# Patient Record
Sex: MALE | Race: WHITE | NOT HISPANIC OR LATINO | Employment: OTHER | URBAN - METROPOLITAN AREA
[De-identification: names, ages, dates, MRNs, and addresses within clinical notes are randomized per-mention and may not be internally consistent; named-entity substitution may affect disease eponyms.]

---

## 2023-09-04 PROBLEM — E83.51 LOW CALCIUM LEVELS: Status: ACTIVE | Noted: 2023-09-04

## 2023-09-04 PROBLEM — R91.8 LUNG NODULES: Status: ACTIVE | Noted: 2023-09-04

## 2023-09-04 PROBLEM — K55.069 SUPERIOR MESENTERIC VEIN THROMBOSIS (MULTI): Status: ACTIVE | Noted: 2023-09-04

## 2023-09-04 PROBLEM — T14.8XXA PENETRATING INJURY: Status: ACTIVE | Noted: 2023-09-04

## 2023-09-04 PROBLEM — K21.00 BILE REFLUX ESOPHAGITIS: Status: ACTIVE | Noted: 2023-09-04

## 2023-09-04 PROBLEM — R13.19 OTHER DYSPHAGIA: Status: ACTIVE | Noted: 2023-09-04

## 2023-09-04 PROBLEM — R63.4 WEIGHT LOSS: Status: ACTIVE | Noted: 2023-09-04

## 2023-09-04 PROBLEM — I70.0 ATHEROSCLEROSIS OF AORTA (CMS-HCC): Status: ACTIVE | Noted: 2023-09-04

## 2023-09-04 PROBLEM — K44.9 HIATAL HERNIA: Status: ACTIVE | Noted: 2023-09-04

## 2023-09-04 PROBLEM — G47.00 INSOMNIA: Status: ACTIVE | Noted: 2023-09-04

## 2023-09-04 PROBLEM — Z98.890 HISTORY OF ESOPHAGECTOMY: Status: ACTIVE | Noted: 2023-09-04

## 2023-09-04 PROBLEM — B35.1 ONYCHOMYCOSIS OF TOENAIL: Status: ACTIVE | Noted: 2023-09-04

## 2023-09-04 PROBLEM — K21.9 GASTROESOPHAGEAL REFLUX DISEASE: Status: ACTIVE | Noted: 2023-09-04

## 2023-09-04 PROBLEM — I71.40 AAA (ABDOMINAL AORTIC ANEURYSM) (CMS-HCC): Status: ACTIVE | Noted: 2023-09-04

## 2023-09-04 PROBLEM — Z85.01 HISTORY OF ESOPHAGEAL CANCER: Status: ACTIVE | Noted: 2023-09-04

## 2023-09-04 PROBLEM — R25.2 CRAMPS, MUSCLE, GENERAL: Status: ACTIVE | Noted: 2023-09-04

## 2023-09-04 PROBLEM — J44.9 CHRONIC OBSTRUCTIVE PULMONARY DISEASE (MULTI): Status: ACTIVE | Noted: 2023-09-04

## 2023-09-04 PROBLEM — Z90.49 HISTORY OF ESOPHAGECTOMY: Status: ACTIVE | Noted: 2023-09-04

## 2023-09-04 PROBLEM — R14.0 POSTPRANDIAL ABDOMINAL BLOATING: Status: ACTIVE | Noted: 2023-09-04

## 2023-09-04 PROBLEM — I25.10 CORONARY ARTERY CALCIFICATION SEEN ON CT SCAN: Status: ACTIVE | Noted: 2023-09-04

## 2023-09-04 PROBLEM — I81 PORTAL VEIN THROMBOSIS: Status: ACTIVE | Noted: 2023-09-04

## 2023-09-04 RX ORDER — OMEPRAZOLE 40 MG/1
1 CAPSULE, DELAYED RELEASE ORAL DAILY
COMMUNITY
Start: 2020-05-05

## 2023-09-04 RX ORDER — TIOTROPIUM BROMIDE AND OLODATEROL 3.124; 2.736 UG/1; UG/1
2 SPRAY, METERED RESPIRATORY (INHALATION) DAILY
COMMUNITY
Start: 2020-01-10

## 2023-09-04 RX ORDER — SUCRALFATE 1 G/1
TABLET ORAL
COMMUNITY
Start: 2020-09-01

## 2023-09-04 RX ORDER — CHLORHEXIDINE GLUCONATE 40 MG/ML
SOLUTION TOPICAL
COMMUNITY
Start: 2022-08-24

## 2023-09-04 RX ORDER — PRAVASTATIN SODIUM 10 MG/1
1 TABLET ORAL DAILY
COMMUNITY
Start: 2022-08-25

## 2023-09-04 RX ORDER — CHLORHEXIDINE GLUCONATE ORAL RINSE 1.2 MG/ML
SOLUTION DENTAL
COMMUNITY
Start: 2022-08-24

## 2023-09-04 RX ORDER — ALBUTEROL SULFATE 90 UG/1
AEROSOL, METERED RESPIRATORY (INHALATION)
COMMUNITY
Start: 2018-05-03

## 2023-09-04 RX ORDER — TRAZODONE HYDROCHLORIDE 100 MG/1
1 TABLET ORAL NIGHTLY
COMMUNITY
Start: 2016-08-12

## 2023-09-04 RX ORDER — METOCLOPRAMIDE 10 MG/1
1 TABLET ORAL
COMMUNITY
Start: 2020-07-07

## 2023-10-10 ENCOUNTER — OFFICE VISIT (OUTPATIENT)
Dept: SURGERY | Facility: CLINIC | Age: 63
End: 2023-10-10
Payer: MEDICARE

## 2023-10-10 VITALS
BODY MASS INDEX: 19.12 KG/M2 | SYSTOLIC BLOOD PRESSURE: 138 MMHG | DIASTOLIC BLOOD PRESSURE: 82 MMHG | RESPIRATION RATE: 17 BRPM | WEIGHT: 140.87 LBS | OXYGEN SATURATION: 97 % | HEART RATE: 73 BPM | TEMPERATURE: 97.9 F

## 2023-10-10 DIAGNOSIS — C15.5 MALIGNANT NEOPLASM OF LOWER THIRD OF ESOPHAGUS (MULTI): Primary | ICD-10-CM

## 2023-10-10 PROCEDURE — 1125F AMNT PAIN NOTED PAIN PRSNT: CPT | Performed by: THORACIC SURGERY (CARDIOTHORACIC VASCULAR SURGERY)

## 2023-10-10 PROCEDURE — 99214 OFFICE O/P EST MOD 30 MIN: CPT | Performed by: THORACIC SURGERY (CARDIOTHORACIC VASCULAR SURGERY)

## 2023-10-10 PROCEDURE — 1036F TOBACCO NON-USER: CPT | Performed by: THORACIC SURGERY (CARDIOTHORACIC VASCULAR SURGERY)

## 2023-10-10 PROCEDURE — 1159F MED LIST DOCD IN RCRD: CPT | Performed by: THORACIC SURGERY (CARDIOTHORACIC VASCULAR SURGERY)

## 2023-10-10 RX ORDER — CEPHALEXIN 500 MG/1
500 CAPSULE ORAL 2 TIMES DAILY
COMMUNITY
Start: 2023-10-07

## 2023-10-10 ASSESSMENT — ENCOUNTER SYMPTOMS
ABDOMINAL DISTENTION: 0
PSYCHIATRIC NEGATIVE: 1
VOMITING: 0
DEPRESSION: 0
ALLERGIC/IMMUNOLOGIC NEGATIVE: 1
PALPITATIONS: 0
MUSCULOSKELETAL NEGATIVE: 1
ENDOCRINE NEGATIVE: 1
WHEEZING: 0
NEUROLOGICAL NEGATIVE: 1
CONSTIPATION: 0
UNEXPECTED WEIGHT CHANGE: 0
ABDOMINAL PAIN: 0
DIARRHEA: 0
DIAPHORESIS: 0
FEVER: 0
CHOKING: 0
EYES NEGATIVE: 1
FATIGUE: 0
COUGH: 0
HEMATOLOGIC/LYMPHATIC NEGATIVE: 1
LOSS OF SENSATION IN FEET: 0
CHEST TIGHTNESS: 0
SHORTNESS OF BREATH: 0
OCCASIONAL FEELINGS OF UNSTEADINESS: 0
CHILLS: 0
NAUSEA: 0
APPETITE CHANGE: 0
STRIDOR: 0

## 2023-10-10 ASSESSMENT — PATIENT HEALTH QUESTIONNAIRE - PHQ9
SUM OF ALL RESPONSES TO PHQ9 QUESTIONS 1 AND 2: 0
1. LITTLE INTEREST OR PLEASURE IN DOING THINGS: NOT AT ALL
2. FEELING DOWN, DEPRESSED OR HOPELESS: NOT AT ALL

## 2023-10-10 ASSESSMENT — COLUMBIA-SUICIDE SEVERITY RATING SCALE - C-SSRS
2. HAVE YOU ACTUALLY HAD ANY THOUGHTS OF KILLING YOURSELF?: NO
1. IN THE PAST MONTH, HAVE YOU WISHED YOU WERE DEAD OR WISHED YOU COULD GO TO SLEEP AND NOT WAKE UP?: NO
6. HAVE YOU EVER DONE ANYTHING, STARTED TO DO ANYTHING, OR PREPARED TO DO ANYTHING TO END YOUR LIFE?: NO

## 2023-10-10 ASSESSMENT — PAIN SCALES - GENERAL: PAINLEVEL: 6

## 2023-10-10 NOTE — PROGRESS NOTES
Subjective   Patient ID: Veto Rivera is a 63 y.o. male who presents for No chief complaint on file..  HPI  Mr. Veto Rivera is a 59 year old male s/p esophagogastroduodenoscopy, nasogastric tube placement, medial supraumbilical laparotomy, hiatal hernia repair and reduction of the colon contents into abdomen, mesh biologic repair of the defect with Phasix mesh and left chest tube placement on 2/28/20 for hiatal hernia in the left chest containing colon. He is s/p [facility] [place] Ranjan for esophageal cancer with complete response in June 2016. Today I am evaluating his esophagogram 5 months after his Hiatal repair.      He continues to complain of reflux and dyspepsia that resolved when taking his PPE and diet changes. I now discussed with him diversifying his diet, and evaluate daily addition of a different type of food.   His chest CT performed 5/1/20 shows no hiatal hernia recurrence. HIs gastric conduit is not excessively distended. Appears mostly stable compared to previous scans. No evidence of recurrence.    The esophagram perfomed 7/7/2020 shows adequate passage of contrast into the abdomen with redundant gastric conduit into the left chest without zeb obstruction.   I prescribed to continue omeprazol 20-40 mg bid for at least 6-8 weeks more and as needed. I also gave him dietary recommendations, diversifying. I started him on carafate 1 g qid. He did improve with diet and symptom control, and with reglan. I also discussed with him maintaining his weight and decreasing the physical activity with high intraabdominal secondary pressure, that could cause recurrence. REcommended increasing his intake. He also wants to have the epigastric stitch poking his skin in the epigastric area. If it continues to have sympomts I will propose remove his epigastric stitch in the OR.   He has a f/u appointment with his vascular surgeon, Dr. Berg, with a CT angio of the abdomen during the last week of september.     Update  7/5/2022  He has a history of esophagectomy in 2016 by Dr. Canas. Multiple hernia operations last 1 by Dr. Pina where the mesh was used. Since then he has not felt well and he has had a lot of reflux symptoms. He feels like he cannot keep up his weight and he keeps losing weight and was recently in the 120s to 130s. CT scan was done there is no official report yet but there is little dilation of the conduit into the right side of the chest. Since his last hernia operation he required omeprazole which he is still taking. He has no complaints of chronic cough or evidence of aspiration. He also has a small suture in the most superior part of his laparotomy which is bothering him. I had a discussion with him about what the next step should be able to start with endoscopy and possible dilation of his pylorus and distal part of the conduit. This might be some kind of outlet obstruction. At the same time we will remove that suture from his abdomen.     Update 8/2/2022  He recovered very well from his EGD dilation. Unfortunately symptoms are not getting any better. I think he has redundant conduit into the right side of the chest and that is why its not draining appropriately. He has been struggling with putting on weight and keeping up with nutrition. After a lengthy discussion about the options we have decided to go for a mobilization of the conduit and the chest with lysis of adhesions in the abdomen and placement of a feeding tube. I had a very candid discussion with him about the possible complications and how difficult this surgery would be based on the multiple different procedures that have been done to him in the past. He is in agreement with it we will pick a date to do this operation.     Update 10/11/2022  He has been doing very well. Tolerating food he feels a complete change with no pain when he eats and good emptying. Chest x-ray showing minimal subcutaneous air no signs of infection. Follow-up with me in a  year.    Update 10/10/2023  Doing well no issues.  Tolerating food and gaining weight.  No dysphagia.  He is very happy with the results of the operation.  I will see him again in 1 year.    Review of Systems   Constitutional:  Negative for appetite change, chills, diaphoresis, fatigue, fever and unexpected weight change.   HENT: Negative.     Eyes: Negative.    Respiratory:  Negative for cough, choking, chest tightness, shortness of breath, wheezing and stridor.    Cardiovascular:  Negative for chest pain, palpitations and leg swelling.   Gastrointestinal:  Negative for abdominal distention, abdominal pain, constipation, diarrhea, nausea and vomiting.   Endocrine: Negative.    Genitourinary: Negative.    Musculoskeletal: Negative.    Skin: Negative.    Allergic/Immunologic: Negative.    Neurological: Negative.    Hematological: Negative.    Psychiatric/Behavioral: Negative.     All other systems reviewed and are negative.      Objective   Physical Exam  Constitutional:       Appearance: Normal appearance.   HENT:      Head: Normocephalic and atraumatic.      Nose: Nose normal.      Mouth/Throat:      Mouth: Mucous membranes are moist.      Pharynx: Oropharynx is clear.   Eyes:      Extraocular Movements: Extraocular movements intact.      Conjunctiva/sclera: Conjunctivae normal.      Pupils: Pupils are equal, round, and reactive to light.   Cardiovascular:      Rate and Rhythm: Normal rate and regular rhythm.      Pulses: Normal pulses.      Heart sounds: Normal heart sounds.   Pulmonary:      Effort: Pulmonary effort is normal. No respiratory distress.      Breath sounds: Normal breath sounds. No stridor. No wheezing, rhonchi or rales.   Chest:      Chest wall: No tenderness.   Abdominal:      General: Abdomen is flat. Bowel sounds are normal.      Palpations: Abdomen is soft.   Musculoskeletal:         General: Normal range of motion.      Cervical back: Normal range of motion and neck supple.   Skin:      General: Skin is warm and dry.      Capillary Refill: Capillary refill takes less than 2 seconds.   Neurological:      General: No focal deficit present.      Mental Status: He is alert and oriented to person, place, and time.         Assessment/Plan   Diagnoses and all orders for this visit:  Malignant neoplasm of lower third of esophagus (CMS/HCC)    Follow-up in 1 year.    Bin Arevalo MD  Thoracic and Esophageal Surgery

## 2025-05-19 ENCOUNTER — APPOINTMENT (OUTPATIENT)
Dept: ORTHOPEDIC SURGERY | Facility: CLINIC | Age: 65
End: 2025-05-19
Payer: MEDICARE